# Patient Record
Sex: MALE | Race: WHITE | NOT HISPANIC OR LATINO | Employment: FULL TIME | URBAN - METROPOLITAN AREA
[De-identification: names, ages, dates, MRNs, and addresses within clinical notes are randomized per-mention and may not be internally consistent; named-entity substitution may affect disease eponyms.]

---

## 2021-12-15 ENCOUNTER — OFFICE VISIT (OUTPATIENT)
Dept: URGENT CARE | Facility: URGENT CARE | Age: 31
End: 2021-12-15
Payer: COMMERCIAL

## 2021-12-15 VITALS
WEIGHT: 192 LBS | OXYGEN SATURATION: 100 % | TEMPERATURE: 97.8 F | SYSTOLIC BLOOD PRESSURE: 154 MMHG | DIASTOLIC BLOOD PRESSURE: 83 MMHG | HEART RATE: 73 BPM

## 2021-12-15 DIAGNOSIS — R10.84 ABDOMINAL PAIN, GENERALIZED: Primary | ICD-10-CM

## 2021-12-15 DIAGNOSIS — Z20.822 SUSPECTED 2019 NOVEL CORONAVIRUS INFECTION: ICD-10-CM

## 2021-12-15 DIAGNOSIS — R03.0 ELEVATED BLOOD PRESSURE READING WITHOUT DIAGNOSIS OF HYPERTENSION: ICD-10-CM

## 2021-12-15 LAB
DEPRECATED S PYO AG THROAT QL EIA: NEGATIVE
GROUP A STREP BY PCR: NOT DETECTED

## 2021-12-15 PROCEDURE — 87651 STREP A DNA AMP PROBE: CPT | Performed by: FAMILY MEDICINE

## 2021-12-15 PROCEDURE — U0003 INFECTIOUS AGENT DETECTION BY NUCLEIC ACID (DNA OR RNA); SEVERE ACUTE RESPIRATORY SYNDROME CORONAVIRUS 2 (SARS-COV-2) (CORONAVIRUS DISEASE [COVID-19]), AMPLIFIED PROBE TECHNIQUE, MAKING USE OF HIGH THROUGHPUT TECHNOLOGIES AS DESCRIBED BY CMS-2020-01-R: HCPCS | Performed by: FAMILY MEDICINE

## 2021-12-15 PROCEDURE — 99204 OFFICE O/P NEW MOD 45 MIN: CPT | Performed by: FAMILY MEDICINE

## 2021-12-15 PROCEDURE — U0005 INFEC AGEN DETEC AMPLI PROBE: HCPCS | Performed by: FAMILY MEDICINE

## 2021-12-15 NOTE — LETTER
[facility] CARE [place]  [address] [facility] [address]  Phone: [phone]    December 15, 2021        Alfonso Odell  [address] [facility] [address]          To whom it may concern:    RE: Alfonso Odell    Patient was seen and treated today at our clinic.  Please stay home until covid test results are back   If negative may return to work once symptoms have improved for 24 hours     Please contact me for questions or concerns.      Sincerely,        Tamara Giang MD

## 2021-12-15 NOTE — PROGRESS NOTES
Chief complaint: abdominal pain    4 days ago started feeling sick abdominal pain  3 days ago patient started with diarrhea - straight water and then headaches   Then the diarrhea has stopped  However still having off and on headache and stomach  Pain    Patient trying to keep hydrated    Feeling tired    Patient took an at home covid and was negative   location: lower abdominal area   Severity: off and on   description: crampy gassy   Some nausea   aggravating symptoms: coming and going in waves   relieving symptoms: if he doesn't eat seems to be ok   nausea and vomiting: only nausea no vomiting   diarrhea: no diarrhea for the past 2 days   treatments tried: tums    urinary symptoms:  none   flank pain:  none  fever or chills:  none  weight loss or constitutional symptoms: none  melena, hematochezia, or hematemesis: none    BP elevated today but asymptomatic. No headache no blurring of vision no chest pain no shortness of breath no dizziness no unsteadiness no numbness no weakness    Problem list, Medication list, Allergies, and Medical/Social/Surgical histories reviewed in [facility] and updated as appropriate.      ROS:    Constitutional, HEENT, cardiovascular, pulmonary, GI, , musculoskeletal, neuro, skin, endocrine and psych systems are negative, except as otherwise noted.    OBJECTIVE:  BP (!) 154/83   Pulse 73   Temp 97.8  F (36.6  C) (Tympanic)   Wt 87.1 kg (192 lb)   SpO2 100%    General:   awake, alert, and cooperative.  NAD.   Head: Normocephalic, atraumatic.  Eyes: Conjunctiva clear, non icteric. VENANCIO  Heart: Regular rate and rhythm. No murmur.  Lungs: Chest is clear; no wheezes or rales.  ABD: soft, no tenderness to palpation , no rigidity, guarding or rebound , bowel sounds intact  RECTAL: declined/deferred    Neuro: Alert and oriented - normal speech.       Diagnostic Test Results:  Results for orders placed or performed in visit on 12/15/21 (from the past 24 hour(s))   Streptococcus A Rapid Screen  w/Reflex to PCR - Clinic Collect    Specimen: Throat; Swab   Result Value Ref Range    Group A Strep antigen Negative Negative     ASSESSMENT:well appearing  No diagnosis found.    ICD-10-CM    1. Abdominal pain, generalized  R10.84 Streptococcus A Rapid Screen w/Reflex to PCR - Clinic Collect     Group A Streptococcus PCR Throat Swab   2. Elevated blood pressure reading without diagnosis of hypertension  R03.0    3. Suspected 2019 novel coronavirus infection  Z20.822 Symptomatic; Yes; 12/11/2021 COVID-19 Virus (Coronavirus) by PCR Nose         PLAN:   Abdominal cramping with diarrhea   Non tender  We discussed utility of lab and xray on discussion patient would like tot hold off  Symptoms are improving just not completely improved  Brat diet advised  If with any symptoms of chest pain or shortness of breath, lightheadedness, palpitations, feeling like passing out or change and worsening in the quality of your symptoms, please proceed to ER. Recommend follow up with PCP in a few days for re-evaluation.   Rule out covid  Isolate until ruled out   Advised about symptoms which might herald more serious problems.    adverse reactions of medication discussed  Over the counter medications discussed  advised to come back in right away if with any worsening symptoms or if with no relief despite treatment plan  close follow-up recommended.  patient voiced understanding and had no further questions at this time.    Per patient he has white coat hypertension  He will check bp at home if elevated witll follow up with his primary care provider  Alarm signs or symptoms discussed, if present recommend go to ER       Tamara Giang MD

## 2021-12-15 NOTE — LETTER
[facility] CARE [place]  [address] [facility] [address]  Phone: [phone]    December 15, 2021        Alfonso Odell  [address] [facility] [address]          To whom it may concern:    RE: Alfonso Odell    Patient was seen and treated today at our clinic.  Recommend staying home until symptoms improve for 24 hours     Please contact me for questions or concerns.      Sincerely,        aTmara Giang MD

## 2021-12-16 LAB — SARS-COV-2 RNA RESP QL NAA+PROBE: NEGATIVE

## 2022-02-06 ENCOUNTER — HEALTH MAINTENANCE LETTER (OUTPATIENT)
Age: 32
End: 2022-02-06

## 2022-10-03 ENCOUNTER — HEALTH MAINTENANCE LETTER (OUTPATIENT)
Age: 32
End: 2022-10-03

## 2023-02-12 ENCOUNTER — HEALTH MAINTENANCE LETTER (OUTPATIENT)
Age: 33
End: 2023-02-12

## 2024-03-09 ENCOUNTER — HEALTH MAINTENANCE LETTER (OUTPATIENT)
Age: 34
End: 2024-03-09